# Patient Record
Sex: MALE | Race: OTHER | Employment: OTHER | URBAN - METROPOLITAN AREA
[De-identification: names, ages, dates, MRNs, and addresses within clinical notes are randomized per-mention and may not be internally consistent; named-entity substitution may affect disease eponyms.]

---

## 2017-08-07 ENCOUNTER — NEW PATIENT COMPREHENSIVE (OUTPATIENT)
Dept: URBAN - METROPOLITAN AREA CLINIC 40 | Facility: CLINIC | Age: 72
End: 2017-08-07

## 2017-08-07 DIAGNOSIS — H40.013: ICD-10-CM

## 2017-08-07 DIAGNOSIS — H25.813: ICD-10-CM

## 2017-08-07 PROCEDURE — 92310-1 LEVEL 1 CONTACT LENS MANAGEMENT

## 2017-08-07 PROCEDURE — 92250 FUNDUS PHOTOGRAPHY W/I&R: CPT

## 2017-08-07 PROCEDURE — 92004 COMPRE OPH EXAM NEW PT 1/>: CPT

## 2017-08-07 PROCEDURE — 92015 DETERMINE REFRACTIVE STATE: CPT

## 2017-08-07 ASSESSMENT — KERATOMETRY
OD_K1POWER_DIOPTERS: 43.50
OD_K2POWER_DIOPTERS: 43.00
OS_K1POWER_DIOPTERS: 43.25
OD_AXISANGLE_DEGREES: 014
OS_K2POWER_DIOPTERS: 44.00
OS_AXISANGLE_DEGREES: 035
OS_AXISANGLE2_DEGREES: 125
OD_AXISANGLE2_DEGREES: 104

## 2017-08-07 ASSESSMENT — TONOMETRY
OD_IOP_MMHG: 18
OS_IOP_MMHG: 18

## 2020-02-07 ENCOUNTER — CATARACT CONSULT (OUTPATIENT)
Dept: URBAN - METROPOLITAN AREA CLINIC 39 | Facility: CLINIC | Age: 75
End: 2020-02-07

## 2020-02-07 DIAGNOSIS — D31.31: ICD-10-CM

## 2020-02-07 DIAGNOSIS — H40.013: ICD-10-CM

## 2020-02-07 DIAGNOSIS — H18.51: ICD-10-CM

## 2020-02-07 DIAGNOSIS — H18.59: ICD-10-CM

## 2020-02-07 DIAGNOSIS — H25.811: ICD-10-CM

## 2020-02-07 DIAGNOSIS — H25.812: ICD-10-CM

## 2020-02-07 PROCEDURE — 92015 DETERMINE REFRACTIVE STATE: CPT

## 2020-02-07 PROCEDURE — 92286 ANT SGM IMG I&R SPECLR MIC: CPT

## 2020-02-07 PROCEDURE — 92134 CPTRZ OPH DX IMG PST SGM RTA: CPT

## 2020-02-07 PROCEDURE — 92025-3 CORNEAL TOPO, REFUSED

## 2020-02-07 PROCEDURE — 92136TC INTERFEROMETRY - TECHNICAL COMPONENT

## 2020-02-07 PROCEDURE — 99214 OFFICE O/P EST MOD 30 MIN: CPT

## 2020-02-07 RX ORDER — DUREZOL 0.5 MG/ML: 1 EMULSION OPHTHALMIC TWICE A DAY

## 2020-02-07 RX ORDER — MOXIFLOXACIN HYDROCHLORIDE 5 MG/ML: 1 SOLUTION/ DROPS OPHTHALMIC

## 2020-02-07 RX ORDER — NEPAFENAC 3 MG/ML: 1 SUSPENSION/ DROPS OPHTHALMIC ONCE A DAY

## 2020-02-07 ASSESSMENT — TONOMETRY
OS_IOP_MMHG: 18
OD_IOP_MMHG: 18

## 2020-02-07 ASSESSMENT — VISUAL ACUITY
OS_SC: <J12
OD_SC: <J12
OD_CC: J1
OS_CC: J1
OS_SC: 20/200
OS_CC: 20/25
OS_AM: 20/20
OD_BAT: 20/50
OD_CC: 20/25
OD_RAM: 20/20
OS_BAT: 20/50
OD_SC: CF 6FT

## 2020-02-18 ENCOUNTER — PRE-OP/H&P (OUTPATIENT)
Dept: URBAN - METROPOLITAN AREA CLINIC 39 | Facility: CLINIC | Age: 75
End: 2020-02-18

## 2020-02-18 ENCOUNTER — SURGERY/PROCEDURE (OUTPATIENT)
Dept: URBAN - METROPOLITAN AREA SURGERY 14 | Facility: SURGERY | Age: 75
End: 2020-02-18

## 2020-02-18 DIAGNOSIS — H25.811: ICD-10-CM

## 2020-02-18 DIAGNOSIS — H40.013: ICD-10-CM

## 2020-02-18 DIAGNOSIS — D31.31: ICD-10-CM

## 2020-02-18 DIAGNOSIS — H25.812: ICD-10-CM

## 2020-02-18 DIAGNOSIS — H18.51: ICD-10-CM

## 2020-02-18 DIAGNOSIS — Z98.890: ICD-10-CM

## 2020-02-18 DIAGNOSIS — H18.59: ICD-10-CM

## 2020-02-18 PROCEDURE — 99211T TECH SERVICE

## 2020-02-18 PROCEDURE — 66984 XCAPSL CTRC RMVL W/O ECP: CPT

## 2020-02-19 ENCOUNTER — POST OP/EVAL OF SECOND EYE (OUTPATIENT)
Dept: URBAN - METROPOLITAN AREA CLINIC 40 | Facility: CLINIC | Age: 75
End: 2020-02-19

## 2020-02-19 DIAGNOSIS — H18.59: ICD-10-CM

## 2020-02-19 DIAGNOSIS — H18.51: ICD-10-CM

## 2020-02-19 DIAGNOSIS — Z96.1: ICD-10-CM

## 2020-02-19 DIAGNOSIS — H40.013: ICD-10-CM

## 2020-02-19 DIAGNOSIS — H25.812: ICD-10-CM

## 2020-02-19 PROCEDURE — 92012 INTRM OPH EXAM EST PATIENT: CPT

## 2020-02-19 PROCEDURE — 99024 POSTOP FOLLOW-UP VISIT: CPT

## 2020-02-19 ASSESSMENT — VISUAL ACUITY
OS_SC: 20/100
OD_SC: 20/25

## 2020-02-19 ASSESSMENT — TONOMETRY
OD_IOP_MMHG: 20
OS_IOP_MMHG: 20

## 2020-02-25 ENCOUNTER — SURGERY/PROCEDURE (OUTPATIENT)
Dept: URBAN - METROPOLITAN AREA SURGERY 14 | Facility: SURGERY | Age: 75
End: 2020-02-25

## 2020-02-25 ENCOUNTER — PRE-OP/H&P (OUTPATIENT)
Dept: URBAN - METROPOLITAN AREA CLINIC 39 | Facility: CLINIC | Age: 75
End: 2020-02-25

## 2020-02-25 DIAGNOSIS — H25.812: ICD-10-CM

## 2020-02-25 DIAGNOSIS — H25.811: ICD-10-CM

## 2020-02-25 DIAGNOSIS — Z96.1: ICD-10-CM

## 2020-02-25 PROCEDURE — 99211T TECH SERVICE

## 2020-02-25 PROCEDURE — 66984 XCAPSL CTRC RMVL W/O ECP: CPT

## 2020-02-25 ASSESSMENT — VISUAL ACUITY
OD_SC: J12
OS_SC: J12
OD_SC: 20/25
OS_SC: 20/200

## 2020-02-25 ASSESSMENT — TONOMETRY
OS_IOP_MMHG: 19
OD_IOP_MMHG: 18

## 2020-02-26 ENCOUNTER — 1 DAY POST-OP (OUTPATIENT)
Dept: URBAN - METROPOLITAN AREA CLINIC 40 | Facility: CLINIC | Age: 75
End: 2020-02-26

## 2020-02-26 DIAGNOSIS — Z96.1: ICD-10-CM

## 2020-02-26 PROCEDURE — 99024 POSTOP FOLLOW-UP VISIT: CPT

## 2020-02-26 ASSESSMENT — VISUAL ACUITY
OS_SC: 20/40-1
OD_SC: 20/25

## 2020-02-26 ASSESSMENT — TONOMETRY: OS_IOP_MMHG: 20

## 2020-05-05 ENCOUNTER — POST-OP (OUTPATIENT)
Dept: URBAN - METROPOLITAN AREA CLINIC 40 | Facility: CLINIC | Age: 75
End: 2020-05-05

## 2020-05-05 DIAGNOSIS — H25.811: ICD-10-CM

## 2020-05-05 DIAGNOSIS — H25.812: ICD-10-CM

## 2020-05-05 DIAGNOSIS — Z96.1: ICD-10-CM

## 2020-05-05 PROCEDURE — 99024 POSTOP FOLLOW-UP VISIT: CPT

## 2020-05-05 ASSESSMENT — KERATOMETRY
OS_K2POWER_DIOPTERS: 43.75
OD_K2POWER_DIOPTERS: 43
OS_K1POWER_DIOPTERS: 43.25
OS_AXISANGLE_DEGREES: 36
OS_AXISANGLE2_DEGREES: 126
OD_K1POWER_DIOPTERS: 43.75
OD_AXISANGLE_DEGREES: 180
OD_AXISANGLE2_DEGREES: 90

## 2020-05-05 ASSESSMENT — TONOMETRY
OS_IOP_MMHG: 18
OD_IOP_MMHG: 18

## 2020-05-05 ASSESSMENT — VISUAL ACUITY
OU_CC: J1+
OS_SC: 20/30
OU_SC: J8
OU_SC: 20/25
OD_SC: J10
OS_SC: J10
OD_SC: 20/30

## 2020-07-08 ENCOUNTER — REFRACTION ONLY (OUTPATIENT)
Dept: URBAN - METROPOLITAN AREA CLINIC 40 | Facility: CLINIC | Age: 75
End: 2020-07-08

## 2020-07-08 DIAGNOSIS — H26.491: ICD-10-CM

## 2020-07-08 DIAGNOSIS — H25.811: ICD-10-CM

## 2020-07-08 DIAGNOSIS — H25.812: ICD-10-CM

## 2020-07-08 DIAGNOSIS — Z96.1: ICD-10-CM

## 2020-07-08 PROCEDURE — 92015 DETERMINE REFRACTIVE STATE: CPT

## 2020-07-08 PROCEDURE — 92012 INTRM OPH EXAM EST PATIENT: CPT

## 2020-07-08 ASSESSMENT — KERATOMETRY
OS_AXISANGLE_DEGREES: 36
OD_K2POWER_DIOPTERS: 43
OD_K1POWER_DIOPTERS: 43.75
OD_AXISANGLE2_DEGREES: 85
OS_K1POWER_DIOPTERS: 43.25
OD_AXISANGLE_DEGREES: 180
OS_AXISANGLE2_DEGREES: 126
OS_AXISANGLE_DEGREES: 34
OD_AXISANGLE_DEGREES: 175
OS_AXISANGLE2_DEGREES: 124
OD_AXISANGLE2_DEGREES: 90
OS_K2POWER_DIOPTERS: 43.75
OD_K1POWER_DIOPTERS: 43.5
OS_K2POWER_DIOPTERS: 43.5

## 2020-07-08 ASSESSMENT — VISUAL ACUITY
OD_BAT: 20/40
OD_SC: 20/25
OS_SC: 20/40
OS_BAT: 20/40
OU_SC: 20/20

## 2020-07-13 ASSESSMENT — KERATOMETRY
OD_AXISANGLE_DEGREES: 180
OS_K2POWER_DIOPTERS: 43.5
OS_AXISANGLE_DEGREES: 34
OS_AXISANGLE2_DEGREES: 126
OD_AXISANGLE_DEGREES: 175
OS_K1POWER_DIOPTERS: 43.25
OD_K2POWER_DIOPTERS: 43
OS_K2POWER_DIOPTERS: 43.75
OS_AXISANGLE2_DEGREES: 124
OS_AXISANGLE_DEGREES: 36
OD_AXISANGLE2_DEGREES: 85
OD_K1POWER_DIOPTERS: 43.5
OD_K1POWER_DIOPTERS: 43.75
OD_AXISANGLE2_DEGREES: 90

## 2020-07-14 ENCOUNTER — POST-OP (OUTPATIENT)
Dept: URBAN - METROPOLITAN AREA CLINIC 40 | Facility: CLINIC | Age: 75
End: 2020-07-14

## 2020-07-14 ENCOUNTER — SURGERY/PROCEDURE (OUTPATIENT)
Dept: URBAN - METROPOLITAN AREA SURGERY 14 | Facility: SURGERY | Age: 75
End: 2020-07-14

## 2020-07-14 ENCOUNTER — YAG EVALUATION (OUTPATIENT)
Dept: URBAN - METROPOLITAN AREA CLINIC 39 | Facility: CLINIC | Age: 75
End: 2020-07-14

## 2020-07-14 DIAGNOSIS — H26.491: ICD-10-CM

## 2020-07-14 DIAGNOSIS — Z98.890: ICD-10-CM

## 2020-07-14 PROCEDURE — 92014 COMPRE OPH EXAM EST PT 1/>: CPT

## 2020-07-14 PROCEDURE — 99024 POSTOP FOLLOW-UP VISIT: CPT

## 2020-07-14 PROCEDURE — 66821 AFTER CATARACT LASER SURGERY: CPT

## 2020-07-14 ASSESSMENT — KERATOMETRY
OS_AXISANGLE_DEGREES: 34
OS_K2POWER_DIOPTERS: 43.75
OD_K1POWER_DIOPTERS: 43.75
OS_K2POWER_DIOPTERS: 43.5
OD_AXISANGLE2_DEGREES: 90
OS_AXISANGLE2_DEGREES: 124
OD_AXISANGLE_DEGREES: 180
OS_K1POWER_DIOPTERS: 43.25
OD_AXISANGLE_DEGREES: 175
OD_K2POWER_DIOPTERS: 43
OS_AXISANGLE2_DEGREES: 126
OD_K1POWER_DIOPTERS: 43.5
OD_AXISANGLE2_DEGREES: 85
OS_AXISANGLE_DEGREES: 36

## 2020-07-14 ASSESSMENT — TONOMETRY
OD_IOP_MMHG: 18
OD_IOP_MMHG: 18
OS_IOP_MMHG: 18

## 2020-07-14 ASSESSMENT — VISUAL ACUITY
OD_SC: 20/30
OD_SC: 20/25
OD_BAT: 20/40
OS_SC: 20/40

## 2020-07-16 ASSESSMENT — KERATOMETRY
OD_K2POWER_DIOPTERS: 43
OS_K2POWER_DIOPTERS: 43.75
OS_K1POWER_DIOPTERS: 43.25
OS_AXISANGLE2_DEGREES: 124
OD_AXISANGLE_DEGREES: 180
OD_AXISANGLE2_DEGREES: 85
OS_AXISANGLE_DEGREES: 36
OS_AXISANGLE2_DEGREES: 126
OD_K1POWER_DIOPTERS: 43.75
OD_AXISANGLE2_DEGREES: 90
OD_AXISANGLE_DEGREES: 175
OS_K2POWER_DIOPTERS: 43.5
OD_K1POWER_DIOPTERS: 43.5
OS_AXISANGLE_DEGREES: 34

## 2020-10-13 ENCOUNTER — ESTABLISHED COMPREHENSIVE EXAM (OUTPATIENT)
Dept: URBAN - METROPOLITAN AREA CLINIC 40 | Facility: CLINIC | Age: 75
End: 2020-10-13

## 2020-10-13 DIAGNOSIS — H52.7: ICD-10-CM

## 2020-10-13 DIAGNOSIS — H40.013: ICD-10-CM

## 2020-10-13 DIAGNOSIS — D31.31: ICD-10-CM

## 2020-10-13 PROCEDURE — 92250 FUNDUS PHOTOGRAPHY W/I&R: CPT

## 2020-10-13 PROCEDURE — 92014 COMPRE OPH EXAM EST PT 1/>: CPT

## 2020-10-13 PROCEDURE — 92015 DETERMINE REFRACTIVE STATE: CPT

## 2020-10-13 ASSESSMENT — KERATOMETRY
OS_AXISANGLE_DEGREES: 33
OD_AXISANGLE_DEGREES: 175
OS_K2POWER_DIOPTERS: 43.75
OD_AXISANGLE_DEGREES: 1
OS_AXISANGLE2_DEGREES: 123
OS_K2POWER_DIOPTERS: 43.5
OD_AXISANGLE2_DEGREES: 90
OD_K1POWER_DIOPTERS: 43.5
OS_AXISANGLE_DEGREES: 34
OS_AXISANGLE2_DEGREES: 126
OS_AXISANGLE2_DEGREES: 124
OD_AXISANGLE2_DEGREES: 85
OS_AXISANGLE_DEGREES: 36
OD_K2POWER_DIOPTERS: 43
OD_K1POWER_DIOPTERS: 43.75
OD_AXISANGLE_DEGREES: 180
OD_AXISANGLE2_DEGREES: 91
OS_K1POWER_DIOPTERS: 43.25
OS_K2POWER_DIOPTERS: 44

## 2020-10-13 ASSESSMENT — VISUAL ACUITY
OD_SC: 20/30
OS_SC: 20/30
OD_CC: J2
OU_SC: 20/25-2
OS_CC: J3
OS_BAT: 20/40
OU_CC: J1

## 2020-10-13 ASSESSMENT — TONOMETRY
OD_IOP_MMHG: 15
OS_IOP_MMHG: 14

## 2020-12-01 ENCOUNTER — REFRACTION ONLY (OUTPATIENT)
Dept: URBAN - METROPOLITAN AREA CLINIC 40 | Facility: CLINIC | Age: 75
End: 2020-12-01

## 2020-12-01 DIAGNOSIS — H52.7: ICD-10-CM

## 2020-12-01 PROCEDURE — 92015 DETERMINE REFRACTIVE STATE: CPT

## 2020-12-01 ASSESSMENT — KERATOMETRY
OS_K2POWER_DIOPTERS: 43.75
OD_AXISANGLE_DEGREES: 180
OS_AXISANGLE2_DEGREES: 126
OS_AXISANGLE_DEGREES: 34
OD_K1POWER_DIOPTERS: 43.75
OS_K2POWER_DIOPTERS: 44
OS_AXISANGLE_DEGREES: 33
OS_AXISANGLE2_DEGREES: 124
OS_K1POWER_DIOPTERS: 43.25
OD_K2POWER_DIOPTERS: 43
OD_AXISANGLE2_DEGREES: 85
OD_K1POWER_DIOPTERS: 43.5
OS_K2POWER_DIOPTERS: 43.5
OS_AXISANGLE2_DEGREES: 123
OD_AXISANGLE2_DEGREES: 91
OD_AXISANGLE2_DEGREES: 90
OD_AXISANGLE_DEGREES: 1
OD_AXISANGLE_DEGREES: 175
OS_AXISANGLE_DEGREES: 36

## 2020-12-01 ASSESSMENT — VISUAL ACUITY: OU_SC: J2

## 2020-12-16 ENCOUNTER — ESTABLISHED PATIENT (OUTPATIENT)
Dept: URBAN - METROPOLITAN AREA CLINIC 40 | Facility: CLINIC | Age: 75
End: 2020-12-16

## 2020-12-16 DIAGNOSIS — H18.593: ICD-10-CM

## 2020-12-16 DIAGNOSIS — H18.519: ICD-10-CM

## 2020-12-16 PROCEDURE — 92012 INTRM OPH EXAM EST PATIENT: CPT

## 2020-12-16 ASSESSMENT — KERATOMETRY
OS_AXISANGLE_DEGREES: 34
OS_K2POWER_DIOPTERS: 43.75
OD_AXISANGLE_DEGREES: 180
OD_AXISANGLE2_DEGREES: 85
OD_AXISANGLE2_DEGREES: 91
OD_K1POWER_DIOPTERS: 43.75
OS_AXISANGLE_DEGREES: 36
OD_AXISANGLE_DEGREES: 1
OS_K1POWER_DIOPTERS: 43.25
OD_K1POWER_DIOPTERS: 43.5
OD_K2POWER_DIOPTERS: 43
OS_AXISANGLE2_DEGREES: 124
OS_K2POWER_DIOPTERS: 44
OS_AXISANGLE2_DEGREES: 123
OD_AXISANGLE2_DEGREES: 90
OS_AXISANGLE_DEGREES: 33
OD_AXISANGLE_DEGREES: 175
OS_AXISANGLE2_DEGREES: 126
OS_K2POWER_DIOPTERS: 43.5

## 2021-03-24 ENCOUNTER — ESTABLISHED PATIENT (OUTPATIENT)
Dept: URBAN - METROPOLITAN AREA CLINIC 40 | Facility: CLINIC | Age: 76
End: 2021-03-24

## 2021-03-24 DIAGNOSIS — H18.513: ICD-10-CM

## 2021-03-24 DIAGNOSIS — H52.223: ICD-10-CM

## 2021-03-24 DIAGNOSIS — H52.03: ICD-10-CM

## 2021-03-24 DIAGNOSIS — H40.013: ICD-10-CM

## 2021-03-24 DIAGNOSIS — H18.593: ICD-10-CM

## 2021-03-24 DIAGNOSIS — D31.31: ICD-10-CM

## 2021-03-24 PROCEDURE — 92015 DETERMINE REFRACTIVE STATE: CPT

## 2021-03-24 PROCEDURE — 92012 INTRM OPH EXAM EST PATIENT: CPT

## 2021-03-24 ASSESSMENT — VISUAL ACUITY
OS_BAT: 20/25
OU_CC: J2+
OS_CC: J2
OD_CC: J2
OS_CC: 20/30
OU_CC: 20/25
OD_CC: 20/30

## 2021-03-24 ASSESSMENT — KERATOMETRY
OS_K1POWER_DIOPTERS: 43.00
OS_AXISANGLE2_DEGREES: 126
OS_AXISANGLE_DEGREES: 033
OS_K1POWER_DIOPTERS: 43.25
OD_AXISANGLE2_DEGREES: 85
OD_AXISANGLE2_DEGREES: 91
OS_K2POWER_DIOPTERS: 43.75
OD_K2POWER_DIOPTERS: 43.25
OD_K2POWER_DIOPTERS: 43
OD_AXISANGLE2_DEGREES: 79
OS_AXISANGLE_DEGREES: 34
OD_AXISANGLE2_DEGREES: 90
OS_AXISANGLE_DEGREES: 36
OS_AXISANGLE2_DEGREES: 124
OD_K1POWER_DIOPTERS: 43.75
OD_AXISANGLE_DEGREES: 175
OS_AXISANGLE_DEGREES: 33
OD_K1POWER_DIOPTERS: 43.50
OS_K2POWER_DIOPTERS: 43.5
OS_AXISANGLE2_DEGREES: 123
OD_AXISANGLE_DEGREES: 169
OD_K1POWER_DIOPTERS: 43.5
OD_AXISANGLE_DEGREES: 180
OS_K2POWER_DIOPTERS: 44
OD_AXISANGLE_DEGREES: 1

## 2021-03-31 ENCOUNTER — REFRACTION ONLY (OUTPATIENT)
Dept: URBAN - METROPOLITAN AREA CLINIC 40 | Facility: CLINIC | Age: 76
End: 2021-03-31

## 2021-03-31 DIAGNOSIS — H52.223: ICD-10-CM

## 2021-03-31 DIAGNOSIS — H52.03: ICD-10-CM

## 2021-03-31 PROCEDURE — 92015 DETERMINE REFRACTIVE STATE: CPT

## 2021-03-31 ASSESSMENT — KERATOMETRY
OS_AXISANGLE2_DEGREES: 123
OD_AXISANGLE_DEGREES: 175
OD_K2POWER_DIOPTERS: 43
OS_AXISANGLE_DEGREES: 34
OS_K2POWER_DIOPTERS: 43.75
OS_K2POWER_DIOPTERS: 44
OD_K1POWER_DIOPTERS: 43.75
OS_AXISANGLE_DEGREES: 36
OS_K1POWER_DIOPTERS: 43.00
OD_K1POWER_DIOPTERS: 43.5
OD_AXISANGLE2_DEGREES: 91
OS_AXISANGLE_DEGREES: 33
OS_K2POWER_DIOPTERS: 43.5
OD_AXISANGLE2_DEGREES: 85
OS_K1POWER_DIOPTERS: 43.25
OS_AXISANGLE2_DEGREES: 124
OD_AXISANGLE_DEGREES: 180
OD_AXISANGLE_DEGREES: 1
OS_AXISANGLE_DEGREES: 033
OD_K2POWER_DIOPTERS: 43.25
OD_AXISANGLE2_DEGREES: 90
OS_AXISANGLE2_DEGREES: 126
OD_AXISANGLE2_DEGREES: 79
OD_K1POWER_DIOPTERS: 43.50
OD_AXISANGLE_DEGREES: 169

## 2021-04-30 NOTE — PATIENT DISCUSSION
Because there is no functional deficit, the patient would like to defer cataract surgery at this time.

## 2021-04-30 NOTE — PATIENT DISCUSSION
Pt would like to see without glasses, but is not ready to undergo cataract surgery at this time. [facility] Vision was discussed as a future cat sx option.

## 2021-06-16 ENCOUNTER — ESTABLISHED PATIENT (OUTPATIENT)
Dept: URBAN - METROPOLITAN AREA CLINIC 40 | Facility: CLINIC | Age: 76
End: 2021-06-16

## 2021-06-16 DIAGNOSIS — H40.013: ICD-10-CM

## 2021-06-16 DIAGNOSIS — D31.31: ICD-10-CM

## 2021-06-16 DIAGNOSIS — H18.593: ICD-10-CM

## 2021-06-16 DIAGNOSIS — H18.513: ICD-10-CM

## 2021-06-16 PROCEDURE — 92012 INTRM OPH EXAM EST PATIENT: CPT

## 2021-06-16 ASSESSMENT — KERATOMETRY
OD_AXISANGLE_DEGREES: 180
OD_K2POWER_DIOPTERS: 43
OS_AXISANGLE_DEGREES: 34
OD_K1POWER_DIOPTERS: 43.50
OD_AXISANGLE_DEGREES: 1
OD_AXISANGLE2_DEGREES: 79
OS_AXISANGLE_DEGREES: 33
OS_K1POWER_DIOPTERS: 43.25
OS_AXISANGLE_DEGREES: 36
OD_K1POWER_DIOPTERS: 43.5
OS_AXISANGLE2_DEGREES: 126
OS_AXISANGLE2_DEGREES: 123
OS_AXISANGLE2_DEGREES: 124
OD_K1POWER_DIOPTERS: 43.75
OD_K2POWER_DIOPTERS: 43.25
OD_AXISANGLE2_DEGREES: 85
OS_K1POWER_DIOPTERS: 43.00
OD_AXISANGLE_DEGREES: 175
OD_AXISANGLE2_DEGREES: 90
OD_AXISANGLE_DEGREES: 169
OS_K2POWER_DIOPTERS: 44
OD_AXISANGLE2_DEGREES: 91
OS_K2POWER_DIOPTERS: 43.5
OS_AXISANGLE_DEGREES: 033
OS_K2POWER_DIOPTERS: 43.75

## 2021-06-16 ASSESSMENT — VISUAL ACUITY
OD_SC: 20/20
OS_SC: 20/20
OU_SC: 20/15

## 2021-10-25 ASSESSMENT — KERATOMETRY
OD_AXISANGLE2_DEGREES: 85
OD_K2POWER_DIOPTERS: 43.25
OD_K1POWER_DIOPTERS: 43.50
OD_K1POWER_DIOPTERS: 43.75
OS_K2POWER_DIOPTERS: 43.5
OD_K2POWER_DIOPTERS: 43
OS_K1POWER_DIOPTERS: 43.00
OD_AXISANGLE2_DEGREES: 90
OS_AXISANGLE_DEGREES: 36
OD_AXISANGLE2_DEGREES: 79
OD_AXISANGLE_DEGREES: 175
OD_AXISANGLE_DEGREES: 169
OS_AXISANGLE2_DEGREES: 126
OS_AXISANGLE_DEGREES: 34
OD_AXISANGLE2_DEGREES: 91
OS_AXISANGLE2_DEGREES: 123
OS_AXISANGLE_DEGREES: 33
OS_AXISANGLE2_DEGREES: 124
OS_K1POWER_DIOPTERS: 43.25
OS_AXISANGLE_DEGREES: 033
OD_AXISANGLE_DEGREES: 180
OD_AXISANGLE_DEGREES: 1
OS_K2POWER_DIOPTERS: 44
OD_K1POWER_DIOPTERS: 43.5
OS_K2POWER_DIOPTERS: 43.75

## 2021-10-26 ENCOUNTER — ESTABLISHED PATIENT (OUTPATIENT)
Dept: URBAN - METROPOLITAN AREA CLINIC 40 | Facility: CLINIC | Age: 76
End: 2021-10-26

## 2021-10-26 DIAGNOSIS — H18.513: ICD-10-CM

## 2021-10-26 DIAGNOSIS — H18.593: ICD-10-CM

## 2021-10-26 DIAGNOSIS — H52.223: ICD-10-CM

## 2021-10-26 DIAGNOSIS — H40.013: ICD-10-CM

## 2021-10-26 DIAGNOSIS — D31.31: ICD-10-CM

## 2021-10-26 PROCEDURE — 92014 COMPRE OPH EXAM EST PT 1/>: CPT

## 2021-10-26 PROCEDURE — 92015 DETERMINE REFRACTIVE STATE: CPT

## 2021-10-26 ASSESSMENT — TONOMETRY
OS_IOP_MMHG: 14
OD_IOP_MMHG: 15

## 2021-10-26 ASSESSMENT — VISUAL ACUITY
OD_CC: J1
OU_CC: J1+
OD_SC: J10
OU_SC: J10
OU_SC: 20/30
OD_CC: 20/25
OD_SC: 20/40
OU_CC: 20/20
OS_CC: 20/25
OS_CC: J1
OS_SC: J10
OS_SC: 20/50

## 2022-01-24 ASSESSMENT — KERATOMETRY
OD_K2POWER_DIOPTERS: 43.25
OD_AXISANGLE2_DEGREES: 90
OD_K1POWER_DIOPTERS: 43.50
OS_AXISANGLE_DEGREES: 36
OS_K1POWER_DIOPTERS: 43.25
OS_K2POWER_DIOPTERS: 43.5
OD_K1POWER_DIOPTERS: 43.75
OS_K2POWER_DIOPTERS: 44
OD_AXISANGLE2_DEGREES: 79
OD_AXISANGLE2_DEGREES: 91
OD_K1POWER_DIOPTERS: 43.5
OD_AXISANGLE_DEGREES: 175
OD_AXISANGLE_DEGREES: 1
OS_AXISANGLE2_DEGREES: 124
OS_K2POWER_DIOPTERS: 43.75
OS_AXISANGLE_DEGREES: 33
OS_K1POWER_DIOPTERS: 43.00
OS_AXISANGLE_DEGREES: 033
OD_AXISANGLE_DEGREES: 180
OS_AXISANGLE2_DEGREES: 126
OD_K2POWER_DIOPTERS: 43
OD_AXISANGLE_DEGREES: 169
OS_AXISANGLE_DEGREES: 34
OS_AXISANGLE2_DEGREES: 123
OD_AXISANGLE2_DEGREES: 85

## 2022-02-15 ENCOUNTER — ESTABLISHED PATIENT (OUTPATIENT)
Dept: URBAN - METROPOLITAN AREA CLINIC 40 | Facility: CLINIC | Age: 77
End: 2022-02-15

## 2022-02-15 DIAGNOSIS — H40.013: ICD-10-CM

## 2022-02-15 DIAGNOSIS — H18.593: ICD-10-CM

## 2022-02-15 DIAGNOSIS — H18.513: ICD-10-CM

## 2022-02-15 DIAGNOSIS — D31.31: ICD-10-CM

## 2022-02-15 PROCEDURE — 92012 INTRM OPH EXAM EST PATIENT: CPT

## 2022-02-15 ASSESSMENT — TONOMETRY
OD_IOP_MMHG: 17
OS_IOP_MMHG: 17

## 2022-02-15 ASSESSMENT — VISUAL ACUITY
OU_CC: 20/20-1
OD_CC: 20/20-2
OS_CC: 20/20-1

## 2024-05-16 ASSESSMENT — KERATOMETRY
OS_K2POWER_DIOPTERS: 43.5
OS_AXISANGLE2_DEGREES: 123
OS_AXISANGLE_DEGREES: 36
OD_AXISANGLE_DEGREES: 175
OD_K2POWER_DIOPTERS: 43
OD_AXISANGLE2_DEGREES: 91
OS_K1POWER_DIOPTERS: 43.00
OS_K2POWER_DIOPTERS: 44
OD_K1POWER_DIOPTERS: 43.50
OD_AXISANGLE2_DEGREES: 85
OD_K1POWER_DIOPTERS: 43.75
OD_AXISANGLE2_DEGREES: 79
OS_K1POWER_DIOPTERS: 43.25
OD_AXISANGLE_DEGREES: 180
OS_K2POWER_DIOPTERS: 43.75
OS_AXISANGLE2_DEGREES: 124
OD_AXISANGLE2_DEGREES: 90
OD_AXISANGLE_DEGREES: 1
OD_K1POWER_DIOPTERS: 43.5
OS_AXISANGLE2_DEGREES: 126
OS_AXISANGLE_DEGREES: 33
OS_AXISANGLE_DEGREES: 033
OD_AXISANGLE_DEGREES: 169
OD_K2POWER_DIOPTERS: 43.25
OS_AXISANGLE_DEGREES: 34

## 2024-05-17 ENCOUNTER — COMPREHENSIVE EXAM (OUTPATIENT)
Dept: URBAN - METROPOLITAN AREA CLINIC 40 | Facility: CLINIC | Age: 79
End: 2024-05-17

## 2024-05-17 DIAGNOSIS — H18.593: ICD-10-CM

## 2024-05-17 DIAGNOSIS — D31.32: ICD-10-CM

## 2024-05-17 DIAGNOSIS — H52.223: ICD-10-CM

## 2024-05-17 DIAGNOSIS — H35.373: ICD-10-CM

## 2024-05-17 DIAGNOSIS — H18.513: ICD-10-CM

## 2024-05-17 PROCEDURE — 92015 DETERMINE REFRACTIVE STATE: CPT

## 2024-05-17 PROCEDURE — 92014 COMPRE OPH EXAM EST PT 1/>: CPT

## 2024-05-17 ASSESSMENT — VISUAL ACUITY
OD_SC: J12
OD_CC: 20/25-2
OS_SC: 20/25
OD_SC: 20/40
OS_CC: 20/20
OU_CC: J1
OU_SC: J10
OU_CC: 20/20
OS_SC: J12
OS_CC: J1
OU_SC: 20/25
OD_CC: J1

## 2024-05-17 ASSESSMENT — TONOMETRY
OS_IOP_MMHG: 20
OD_IOP_MMHG: 20

## 2024-06-10 ASSESSMENT — KERATOMETRY
OS_AXISANGLE2_DEGREES: 124
OS_AXISANGLE_DEGREES: 33
OS_K1POWER_DIOPTERS: 43.25
OS_AXISANGLE_DEGREES: 033
OD_K2POWER_DIOPTERS: 43
OD_AXISANGLE_DEGREES: 175
OS_K2POWER_DIOPTERS: 43.75
OS_AXISANGLE2_DEGREES: 126
OD_AXISANGLE2_DEGREES: 79
OD_AXISANGLE2_DEGREES: 85
OS_AXISANGLE_DEGREES: 34
OD_K1POWER_DIOPTERS: 43.5
OD_AXISANGLE_DEGREES: 169
OD_AXISANGLE2_DEGREES: 91
OS_K2POWER_DIOPTERS: 43.5
OS_K1POWER_DIOPTERS: 43.00
OD_K1POWER_DIOPTERS: 43.75
OD_K2POWER_DIOPTERS: 43.25
OS_AXISANGLE_DEGREES: 36
OS_K2POWER_DIOPTERS: 44
OD_AXISANGLE2_DEGREES: 90
OD_AXISANGLE_DEGREES: 1
OD_AXISANGLE_DEGREES: 180
OD_K1POWER_DIOPTERS: 43.50
OS_AXISANGLE2_DEGREES: 123

## 2024-06-11 ENCOUNTER — FOLLOW UP (OUTPATIENT)
Dept: URBAN - METROPOLITAN AREA CLINIC 40 | Facility: CLINIC | Age: 79
End: 2024-06-11

## 2024-06-11 DIAGNOSIS — H35.373: ICD-10-CM

## 2024-06-11 DIAGNOSIS — H35.722: ICD-10-CM

## 2024-06-11 PROCEDURE — 99213 OFFICE O/P EST LOW 20 MIN: CPT

## 2024-06-11 PROCEDURE — 92134 CPTRZ OPH DX IMG PST SGM RTA: CPT

## 2024-06-11 ASSESSMENT — TONOMETRY
OS_IOP_MMHG: 19
OD_IOP_MMHG: 19

## 2024-06-11 ASSESSMENT — VISUAL ACUITY
OD_CC: 20/25-2
OU_CC: 20/20-1
OS_CC: 20/20